# Patient Record
(demographics unavailable — no encounter records)

---

## 2025-06-02 NOTE — PHYSICAL EXAM
[General Appearance - Alert] : alert [General Appearance - In No Acute Distress] : in no acute distress [Oriented To Time, Place, And Person] : oriented to person, place, and time [Person] : oriented to person [Place] : oriented to place [Time] : oriented to time [Sclera] : the sclera and conjunctiva were normal [Outer Ear] : the ears and nose were normal in appearance [Neck Appearance] : the appearance of the neck was normal [] : no respiratory distress [Skin Color & Pigmentation] : normal skin color and pigmentation

## 2025-07-02 NOTE — ASSESSMENT
[FreeTextEntry1] : 26 y/o F presenting for lower back pain after injury herself    ****NOTE IS INCOMPLETE, CHART PREP, WILL BE COMPLETED ONCE PATIENT IS SEEN/EVALUATED****

## 2025-07-02 NOTE — HISTORY OF PRESENT ILLNESS
[de-identified] : Ms. SAMSANETADRAKE CHAIDEZ  is a 28 y/o F presenting with a PMHx   who presents for comprehensive neuro-surgical evaluation of traumatic lower back pain

## 2025-07-02 NOTE — HISTORY OF PRESENT ILLNESS
[de-identified] : Ms. SAMSANETADRAKE CHAIDEZ  is a 26 y/o F presenting with a PMHx   who presents for comprehensive neuro-surgical evaluation of traumatic lower back pain